# Patient Record
Sex: FEMALE | Race: WHITE | NOT HISPANIC OR LATINO | Employment: OTHER | URBAN - METROPOLITAN AREA
[De-identification: names, ages, dates, MRNs, and addresses within clinical notes are randomized per-mention and may not be internally consistent; named-entity substitution may affect disease eponyms.]

---

## 2022-05-21 ENCOUNTER — OFFICE VISIT (OUTPATIENT)
Dept: URGENT CARE | Facility: CLINIC | Age: 82
End: 2022-05-21
Payer: COMMERCIAL

## 2022-05-21 VITALS
SYSTOLIC BLOOD PRESSURE: 170 MMHG | HEIGHT: 60 IN | WEIGHT: 110 LBS | TEMPERATURE: 97.7 F | BODY MASS INDEX: 21.6 KG/M2 | OXYGEN SATURATION: 99 % | HEART RATE: 64 BPM | DIASTOLIC BLOOD PRESSURE: 86 MMHG | RESPIRATION RATE: 17 BRPM

## 2022-05-21 DIAGNOSIS — S61.213A LACERATION OF LEFT MIDDLE FINGER WITHOUT FOREIGN BODY WITHOUT DAMAGE TO NAIL, INITIAL ENCOUNTER: Primary | ICD-10-CM

## 2022-05-21 PROCEDURE — 99213 OFFICE O/P EST LOW 20 MIN: CPT | Performed by: PHYSICIAN ASSISTANT

## 2022-05-21 PROCEDURE — 12001 RPR S/N/AX/GEN/TRNK 2.5CM/<: CPT | Performed by: PHYSICIAN ASSISTANT

## 2022-05-21 RX ORDER — VALSARTAN AND HYDROCHLOROTHIAZIDE 160; 12.5 MG/1; MG/1
TABLET, FILM COATED ORAL
COMMUNITY
Start: 2022-02-13

## 2022-05-21 RX ORDER — LEVOTHYROXINE SODIUM 0.05 MG/1
TABLET ORAL
COMMUNITY
Start: 2022-04-29

## 2022-05-21 RX ORDER — ROSUVASTATIN CALCIUM 5 MG/1
TABLET, COATED ORAL
COMMUNITY
Start: 2022-02-13

## 2022-05-21 RX ORDER — LOSARTAN POTASSIUM AND HYDROCHLOROTHIAZIDE 12.5; 5 MG/1; MG/1
1 TABLET ORAL
COMMUNITY

## 2022-05-21 RX ORDER — ATENOLOL 25 MG/1
TABLET ORAL
COMMUNITY
Start: 2022-02-13

## 2022-05-21 RX ORDER — VITAMIN E 268 MG
400 CAPSULE ORAL
COMMUNITY

## 2022-05-21 NOTE — PROGRESS NOTES
[facility]        NAME: Jayme Savage is a 79 y o  female  : 1940    MRN: [id number]  DATE: May 21, 2022  TIME: 9:33 AM    Assessment and Plan   Laceration of left middle finger without foreign body without damage to nail, initial encounter [S61 [id number]]  1  Laceration of left middle finger without foreign body without damage to nail, initial encounter           Patient Instructions     Keep wound clean, dry, and covered  Pat wound dry if it gets wet  Splint as needed first few days  Monitor wound for signs of infection  Continue with over-the-counter symptom relief as needed    Follow up with PCP in 3-5 days  Proceed to  ER if symptoms worsen  Chief Complaint     Chief Complaint   Patient presents with    Finger Laceration     Finger laceration occurred around 4pm  on left middle finger  She states she is not on any form of thinners or platelet aggregates  She had a bandage wrapped on her finger throughout the night; however, this morning when she removed the bandage, it continued to bleed  She is visiting her son's family  History of Present Illness       Patient presents with son for complaint of laceration of her left middle finger which occurred yesterday evening  She states that she cleaned the wound and applied a Band-Aid but states the wound continues to bleed today  She states that her finger feels best when elevated above her heart  She denies taking blood thinners  She states that she had a tetanus booster 4 years ago  She denies fever, chills, sweats, numbness, tingling, significant swelling, & purulent drainage  Review of Systems   Review of Systems   Constitutional: Negative for chills and fever  HENT: Negative for ear pain and sore throat  Eyes: Negative for pain and visual disturbance  Respiratory: Negative for cough and shortness of breath  Cardiovascular: Negative for chest pain and palpitations     Gastrointestinal: Negative for abdominal pain and vomiting  Genitourinary: Negative for dysuria and hematuria  Musculoskeletal: Negative for arthralgias and back pain  Skin: Positive for wound  Negative for color change and rash  Neurological: Negative for seizures and syncope  All other systems reviewed and are negative  Current Medications       Current Outpatient Medications:     atenolol (TENORMIN) 25 mg tablet, , Disp: , Rfl:     Cholecalciferol 50 MCG (2000 UT) CAPS, Take by mouth, Disp: , Rfl:     co-enzyme Q-10 30 MG capsule, Take 30 mg by mouth, Disp: , Rfl:     levothyroxine 50 mcg tablet, , Disp: , Rfl:     losartan-hydrochlorothiazide (HYZAAR) 50-12 5 mg per tablet, Take 1 tablet by mouth, Disp: , Rfl:     metFORMIN (GLUCOPHAGE) 500 mg tablet, , Disp: , Rfl:     rosuvastatin (CRESTOR) 5 mg tablet, , Disp: , Rfl:     valsartan-hydrochlorothiazide (DIOVAN-HCT) 160-12 5 MG per tablet, , Disp: , Rfl:     vitamin E, tocopherol, 400 units capsule, Take 400 Units by mouth, Disp: , Rfl:     Current Allergies     Allergies as of 05/21/2022    (No Known Allergies)            The following portions of the patient's history were reviewed and updated as appropriate: allergies, current medications, past family history, past medical history, past social history, past surgical history and problem list      Past Medical History:   Diagnosis Date    Disease of thyroid gland     Hyperlipidemia     Hypertension        History reviewed  No pertinent surgical history  History reviewed  No pertinent family history  Medications have been verified  Objective   /86   Pulse 64   Temp 97 7 °F (36 5 °C) (Tympanic)   Resp 17   Ht 5' (1 524 m)   Wt 49 9 kg (110 lb)   SpO2 99%   BMI 21 48 kg/m²   No LMP recorded  Patient is postmenopausal          Physical Exam     Physical Exam  Vitals and nursing note reviewed  Constitutional:       General: She is not in acute distress  Appearance: Normal appearance   She is well-developed  She is not ill-appearing or diaphoretic  HENT:      Head: Normocephalic and atraumatic  Eyes:      Conjunctiva/sclera: Conjunctivae normal       Pupils: Pupils are equal, round, and reactive to light  Cardiovascular:      Rate and Rhythm: Normal rate and regular rhythm  Heart sounds: Normal heart sounds  Pulmonary:      Effort: Pulmonary effort is normal  No respiratory distress  Breath sounds: Normal breath sounds  No stridor  Musculoskeletal:      Cervical back: Normal range of motion and neck supple  Lymphadenopathy:      Cervical: No cervical adenopathy  Skin:     General: Skin is warm and dry  Capillary Refill: Capillary refill takes less than 2 seconds  Findings: No rash  Comments: 1 5 cm open laceration, actively oozing blood; some mild surrounding maceration of skin d/t prior dressing; NTTP; distal sensation intact   Neurological:      Mental Status: She is alert and oriented to person, place, and time  Cranial Nerves: No cranial nerve deficit  Sensory: No sensory deficit  Psychiatric:         Behavior: Behavior normal          Thought Content: Thought content normal        Laceration repair    Date/Time: 5/21/2022 9:27 AM  Performed by: Santiago Martinez PA-C  Authorized by: Santiago Martinez PA-C   Consent: Verbal consent obtained    Risks and benefits: risks, benefits and alternatives were discussed  Consent given by: patient  Patient understanding: patient states understanding of the procedure being performed  Patient consent: the patient's understanding of the procedure matches consent given  Procedure consent: procedure consent matches procedure scheduled  Required items: required blood products, implants, devices, and special equipment available  Patient identity confirmed: verbally with patient  Time out: Immediately prior to procedure a "time out" was called to verify the correct patient, procedure, equipment, support staff and site/side marked as required  Body area: upper extremity  Location details: left long finger  Laceration length: 1 5 cm  Foreign bodies: no foreign bodies      Procedure Details:  Preparation: Patient was prepped and draped in the usual sterile fashion  Irrigation solution: saline  Irrigation method: syringe  Amount of cleaning: standard  Debridement: none  Degree of undermining: none  Skin closure: glue  Approximation: close  Approximation difficulty: simple  Dressing: splint and non-adhesive packing strip  Patient tolerance: patient tolerated the procedure well with no immediate complications  Comments: Patient tolerated procedure well without complication  Discussed good wound care and monitoring for signs of infection  Splint applied to protect wound